# Patient Record
Sex: FEMALE | Race: WHITE | HISPANIC OR LATINO | URBAN - METROPOLITAN AREA
[De-identification: names, ages, dates, MRNs, and addresses within clinical notes are randomized per-mention and may not be internally consistent; named-entity substitution may affect disease eponyms.]

---

## 2024-01-05 ENCOUNTER — APPOINTMENT (RX ONLY)
Dept: URBAN - METROPOLITAN AREA CLINIC 41 | Facility: CLINIC | Age: 39
Setting detail: DERMATOLOGY
End: 2024-01-05

## 2024-01-05 DIAGNOSIS — Z41.9 ENCOUNTER FOR PROCEDURE FOR PURPOSES OTHER THAN REMEDYING HEALTH STATE, UNSPECIFIED: ICD-10-CM

## 2024-01-05 PROCEDURE — ? BOTOX

## 2024-01-05 PROCEDURE — ? COSMETIC CONSULTATION: BOTOX

## 2024-01-05 PROCEDURE — ? ADDITIONAL NOTES

## 2024-01-05 NOTE — PROCEDURE: BOTOX
Levator Labii Superioris Units: 0
Detail Level: Zone
Show Right And Left Pupillary Line Units: No
Additional Area 3 Location: eyebrow lift
Show Depressor Anguli Units: Yes
Price (Use Numbers Only, No Special Characters Or $): 972
Consent: Written consent obtained. Risks include but not limited to lid/brow ptosis, bruising, swelling, diplopia, temporary effect, incomplete chemical denervation.
Additional Area 3 Units: 4
Post-Care Instructions: - Do not lie down for 4 hours after the procedure.\\n- Work the treated area by wrinkling or susan the muscles that were injected for 30 minutes. Avoid massaging and putting forced pressure on areas.\\n- Do not exercise for 24 hours.\\n- Avoid aspirin and aspirin containing products for 24 hours to prevent bruising.\\n- It will take 2 weeks to see full effects of the Botox injection. We recommend scheduling a 1 month follow-up appointment.
Additional Area 2 Location: jelly rolls
Dilution (U/0.1 Cc): 10
Glabellar Complex Units: 20
Additional Area 1 Location: lip flip
Incrementing Botox Units: By 0.5 Units
Forehead Units: 9

## 2024-01-05 NOTE — PROCEDURE: ADDITIONAL NOTES
Additional Notes: EG recommends lightly treating forehead to prevent spocking. Pt agrees. EG counsels this should last 3-4 months, but she’ll likely notice the forehead starting to move sooner than glabella due to light dosing.
Detail Level: Simple
Render Risk Assessment In Note?: no

## 2024-01-05 NOTE — HPI: COSMETIC (BOTOX)
Additional History: Last Botox tx elsewhere was 9/28\\n\\nInterested in tx in glabella and eyebrow lift

## 2024-04-17 ENCOUNTER — APPOINTMENT (RX ONLY)
Dept: URBAN - METROPOLITAN AREA CLINIC 41 | Facility: CLINIC | Age: 39
Setting detail: DERMATOLOGY
End: 2024-04-17

## 2024-04-17 DIAGNOSIS — Z41.9 ENCOUNTER FOR PROCEDURE FOR PURPOSES OTHER THAN REMEDYING HEALTH STATE, UNSPECIFIED: ICD-10-CM

## 2024-04-17 PROCEDURE — ? BOTOX

## 2024-04-17 NOTE — PROCEDURE: BOTOX
Levator Labii Superioris Units: 0
Detail Level: Zone
Show Right And Left Pupillary Line Units: No
Additional Area 3 Location: eyebrow lift
Show Depressor Anguli Units: Yes
Price (Use Numbers Only, No Special Characters Or $): 143
Consent: Written consent obtained. Risks include but not limited to lid/brow ptosis, bruising, swelling, diplopia, temporary effect, incomplete chemical denervation.
Additional Area 3 Units: 4
Post-Care Instructions: - Do not lie down for 4 hours after the procedure.\\n- Work the treated area by wrinkling or susan the muscles that were injected for 30 minutes. Avoid massaging and putting forced pressure on areas.\\n- Do not exercise for 24 hours.\\n- Avoid aspirin and aspirin containing products for 24 hours to prevent bruising.\\n- It will take 2 weeks to see full effects of the Botox injection. We recommend scheduling a 1 month follow-up appointment.
Additional Area 2 Location: jelly rolls
Dilution (U/0.1 Cc): 10
Glabellar Complex Units: 20
Additional Area 1 Location: lip flip
Incrementing Botox Units: By 0.5 Units
Forehead Units: 9

## 2024-08-02 ENCOUNTER — APPOINTMENT (RX ONLY)
Dept: URBAN - METROPOLITAN AREA CLINIC 41 | Facility: CLINIC | Age: 39
Setting detail: DERMATOLOGY
End: 2024-08-02

## 2024-08-02 DIAGNOSIS — Z41.9 ENCOUNTER FOR PROCEDURE FOR PURPOSES OTHER THAN REMEDYING HEALTH STATE, UNSPECIFIED: ICD-10-CM

## 2024-08-02 PROCEDURE — ? BOTOX

## 2024-08-02 NOTE — PROCEDURE: BOTOX
Levator Labii Superioris Units: 0
Detail Level: Zone
Expiration Date (Month Year): 07/2026
Show Right And Left Pupillary Line Units: No
Additional Area 3 Location: eyebrow lift
Show Depressor Anguli Units: Yes
Price (Use Numbers Only, No Special Characters Or $): 535
Consent: Written consent obtained. Risks include but not limited to lid/brow ptosis, bruising, swelling, diplopia, temporary effect, incomplete chemical denervation.
Additional Area 3 Units: 4
Post-Care Instructions: - Do not lie down for 4 hours after the procedure.\\n- Work the treated area by wrinkling or susan the muscles that were injected for 30 minutes. Avoid massaging and putting forced pressure on areas.\\n- Do not exercise for 24 hours.\\n- Avoid aspirin and aspirin containing products for 24 hours to prevent bruising.\\n- It will take 2 weeks to see full effects of the Botox injection. We recommend scheduling a 1 month follow-up appointment.
Additional Area 2 Location: jelly rolls
Dilution (U/0.1 Cc): 10
Lot #: [id number]
Glabellar Complex Units: 20
Additional Area 1 Location: lip flip
Incrementing Botox Units: By 0.5 Units
Forehead Units: 9

## 2024-11-22 ENCOUNTER — APPOINTMENT (RX ONLY)
Dept: URBAN - METROPOLITAN AREA CLINIC 41 | Facility: CLINIC | Age: 39
Setting detail: DERMATOLOGY
End: 2024-11-22

## 2024-11-22 DIAGNOSIS — Z41.9 ENCOUNTER FOR PROCEDURE FOR PURPOSES OTHER THAN REMEDYING HEALTH STATE, UNSPECIFIED: ICD-10-CM

## 2024-11-22 PROCEDURE — ? BOTOX

## 2024-11-22 NOTE — PROCEDURE: BOTOX
Levator Labii Superioris Units: 0
Detail Level: Zone
Expiration Date (Month Year): 07/2026
Show Right And Left Pupillary Line Units: No
Additional Area 3 Location: eyebrow lift
Show Depressor Anguli Units: Yes
Price (Use Numbers Only, No Special Characters Or $): 534.6
Consent: Written consent obtained. Risks include but not limited to lid/brow ptosis, bruising, swelling, diplopia, temporary effect, incomplete chemical denervation.
Additional Area 3 Units: 4
Post-Care Instructions: - Do not lie down for 4 hours after the procedure.\\n- Work the treated area by wrinkling or susan the muscles that were injected for 30 minutes. Avoid massaging and putting forced pressure on areas.\\n- Do not exercise for 24 hours.\\n- Avoid aspirin and aspirin containing products for 24 hours to prevent bruising.\\n- It will take 2 weeks to see full effects of the Botox injection. We recommend scheduling a 1 month follow-up appointment.
Additional Area 2 Location: jelly rolls
Dilution (U/0.1 Cc): 10
Lot #: [id number]
Glabellar Complex Units: 20
Additional Area 1 Location: lip flip
Comments: 10% promotion was applied today, price in chart includes discount
Incrementing Botox Units: By 0.5 Units
Forehead Units: 9

## 2025-03-19 ENCOUNTER — APPOINTMENT (OUTPATIENT)
Dept: URBAN - METROPOLITAN AREA CLINIC 41 | Facility: CLINIC | Age: 40
Setting detail: DERMATOLOGY
End: 2025-03-19

## 2025-03-19 DIAGNOSIS — Z41.9 ENCOUNTER FOR PROCEDURE FOR PURPOSES OTHER THAN REMEDYING HEALTH STATE, UNSPECIFIED: ICD-10-CM

## 2025-03-19 PROCEDURE — ? BOTOX

## 2025-03-19 NOTE — PROCEDURE: BOTOX
Levator Labii Superioris Units: 0
Detail Level: Zone
Expiration Date (Month Year): 02/2027
Show Right And Left Pupillary Line Units: No
Additional Area 3 Location: eyebrow lift
Show Depressor Anguli Units: Yes
Price (Use Numbers Only, No Special Characters Or $): 537
Consent: Written consent obtained. Risks include but not limited to lid/brow ptosis, bruising, swelling, diplopia, temporary effect, incomplete chemical denervation.
Additional Area 3 Units: 4
Post-Care Instructions: - Do not lie down for 4 hours after the procedure.\\n- Work the treated area by wrinkling or susan the muscles that were injected for 30 minutes. Avoid massaging and putting forced pressure on areas.\\n- Do not exercise for 24 hours.\\n- Avoid aspirin and aspirin containing products for 24 hours to prevent bruising.\\n- It will take 2 weeks to see full effects of the Botox injection. We recommend scheduling a 1 month follow-up appointment.
Additional Area 2 Location: jelly rolls
Dilution (U/0.1 Cc): 10
Lot #: [id number]
Glabellar Complex Units: 20
Additional Area 1 Location: lip flip
Incrementing Botox Units: By 0.5 Units
Forehead Units: 9

## 2025-07-02 ENCOUNTER — APPOINTMENT (OUTPATIENT)
Dept: URBAN - METROPOLITAN AREA CLINIC 41 | Facility: CLINIC | Age: 40
Setting detail: DERMATOLOGY
End: 2025-07-02

## 2025-07-02 DIAGNOSIS — Z41.9 ENCOUNTER FOR PROCEDURE FOR PURPOSES OTHER THAN REMEDYING HEALTH STATE, UNSPECIFIED: ICD-10-CM

## 2025-07-02 PROCEDURE — ? BOTOX

## 2025-07-02 NOTE — PROCEDURE: BOTOX
Levator Labii Superioris Units: 0
Detail Level: Zone
Expiration Date (Month Year): 2027/05
Show Right And Left Pupillary Line Units: No
Additional Area 3 Location: eyebrow lift
Show Depressor Anguli Units: Yes
Price (Use Numbers Only, No Special Characters Or $): 392
Consent: Written consent obtained. Risks include but not limited to lid/brow ptosis, bruising, swelling, diplopia, temporary effect, incomplete chemical denervation.
Additional Area 3 Units: 4
Post-Care Instructions: - Do not lie down for 4 hours after the procedure.\\n- Work the treated area by wrinkling or susan the muscles that were injected for 30 minutes. Avoid massaging and putting forced pressure on areas.\\n- Do not exercise for 24 hours.\\n- Avoid aspirin and aspirin containing products for 24 hours to prevent bruising.\\n- It will take 2 weeks to see full effects of the Botox injection. We recommend scheduling a 1 month follow-up appointment.
Additional Area 2 Location: jelly rolls
Dilution (U/0.1 Cc): 10
Lot #: [id number]
Glabellar Complex Units: 20
Additional Area 1 Location: lip flip
Incrementing Botox Units: By 0.5 Units
Forehead Units: 9